# Patient Record
Sex: FEMALE | Race: WHITE | NOT HISPANIC OR LATINO | Employment: FULL TIME | URBAN - METROPOLITAN AREA
[De-identification: names, ages, dates, MRNs, and addresses within clinical notes are randomized per-mention and may not be internally consistent; named-entity substitution may affect disease eponyms.]

---

## 2023-04-20 ENCOUNTER — APPOINTMENT (OUTPATIENT)
Dept: URGENT CARE | Facility: CLINIC | Age: 38
End: 2023-04-20

## 2023-04-20 ENCOUNTER — APPOINTMENT (OUTPATIENT)
Dept: LAB | Facility: CLINIC | Age: 38
End: 2023-04-20

## 2023-04-20 DIAGNOSIS — Z02.1 PRE-EMPLOYMENT HEALTH SCREENING EXAMINATION: ICD-10-CM

## 2023-04-20 DIAGNOSIS — Z02.1 PRE-EMPLOYMENT HEALTH SCREENING EXAMINATION: Primary | ICD-10-CM

## 2023-04-20 LAB — RUBV IGG SERPL IA-ACNC: >175 IU/ML

## 2023-04-20 PROCEDURE — 86480 TB TEST CELL IMMUN MEASURE: CPT

## 2023-04-20 PROCEDURE — 86735 MUMPS ANTIBODY: CPT

## 2023-04-20 PROCEDURE — 86787 VARICELLA-ZOSTER ANTIBODY: CPT

## 2023-04-20 PROCEDURE — 86765 RUBEOLA ANTIBODY: CPT

## 2023-04-20 PROCEDURE — 86762 RUBELLA ANTIBODY: CPT

## 2023-04-20 PROCEDURE — 36415 COLL VENOUS BLD VENIPUNCTURE: CPT

## 2023-04-21 LAB
GAMMA INTERFERON BACKGROUND BLD IA-ACNC: 0.25 IU/ML
M TB IFN-G BLD-IMP: NEGATIVE
M TB IFN-G CD4+ BCKGRND COR BLD-ACNC: 0 IU/ML
M TB IFN-G CD4+ BCKGRND COR BLD-ACNC: 0.01 IU/ML
MEV IGG SER QL IA: ABNORMAL
MITOGEN IGNF BCKGRD COR BLD-ACNC: >10 IU/ML
MUV IGG SER QL IA: NORMAL
VZV IGG SER QL IA: NORMAL

## 2023-05-24 ENCOUNTER — OFFICE VISIT (OUTPATIENT)
Dept: FAMILY MEDICINE CLINIC | Facility: CLINIC | Age: 38
End: 2023-05-24

## 2023-05-24 VITALS
HEIGHT: 63 IN | BODY MASS INDEX: 21.83 KG/M2 | RESPIRATION RATE: 16 BRPM | HEART RATE: 58 BPM | WEIGHT: 123.2 LBS | TEMPERATURE: 98 F | DIASTOLIC BLOOD PRESSURE: 80 MMHG | OXYGEN SATURATION: 99 % | SYSTOLIC BLOOD PRESSURE: 122 MMHG

## 2023-05-24 DIAGNOSIS — F43.21 SITUATIONAL DEPRESSION: Primary | ICD-10-CM

## 2023-05-24 DIAGNOSIS — G47.30 SLEEP APNEA, UNSPECIFIED TYPE: ICD-10-CM

## 2023-05-24 DIAGNOSIS — F41.9 ANXIETY: ICD-10-CM

## 2023-05-24 RX ORDER — ALBUTEROL SULFATE 90 UG/1
2 AEROSOL, METERED RESPIRATORY (INHALATION) EVERY 6 HOURS PRN
COMMUNITY

## 2023-05-24 RX ORDER — CETIRIZINE HYDROCHLORIDE 10 MG/1
10 TABLET ORAL DAILY
COMMUNITY

## 2023-05-24 RX ORDER — AMITRIPTYLINE HYDROCHLORIDE 10 MG/1
10 TABLET, FILM COATED ORAL
Qty: 90 TABLET | Refills: 1 | Status: SHIPPED | OUTPATIENT
Start: 2023-05-24

## 2023-05-24 RX ORDER — HYDROXYZINE HYDROCHLORIDE 25 MG/1
25 TABLET, FILM COATED ORAL
Qty: 30 TABLET | Refills: 1 | Status: SHIPPED | OUTPATIENT
Start: 2023-05-24

## 2023-05-24 NOTE — PROGRESS NOTES
Name: Shruthi Valdes      : 1985      MRN: [id number]  Encounter Provider: Bernardo Ramirez DO  Encounter Date: 2023   Encounter department: [address] [place] [address]      1  Situational depression  Assessment & Plan:  Depression surrounding her sister's recent diagnosis of advanced lung cancer  Combined with other normal life stressors of being a mom to 2 young boys, relocating to the area for work, she has felt overwhelmed lately  She has baseline anxiety that is generally well controlled  She felt like her mood was improved while taking Elavil for migraine headaches which she has since discontinued  She would like to resume Elavil  Referral was provided for social work to assist with community resources for counseling, as well as [facility] psychology referral   We also discussed the employee assistance program may be a good option for her  Orders:  -     hydrOXYzine HCL (ATARAX) 25 mg tablet; Take 1 tablet (25 mg total) by mouth daily at bedtime  -     amitriptyline (ELAVIL) 10 mg tablet; Take 1 tablet (10 mg total) by mouth daily at bedtime  -     Ambulatory Referral to Psychology; Future  -     Ambulatory Referral to Social Work Care Management Program; Future    2  Anxiety  Assessment & Plan:  Some difficulty falling asleep due to ruminating thoughts, life stressors likely contributing  Trial of Atarax at bedtime  I instructed her to start with 12 5 mg at night due to her sensitivity to certain medications  If she tolerates this well or needs a higher dose she understands she can crease to 25 mg  Orders:  -     Ambulatory Referral to Psychology; Future  -     Ambulatory Referral to Social Work Care Management Program; Future    3  Sleep apnea, unspecified type  Assessment & Plan:  She is concern for sleep apnea based on the width of her tongue  Feels like she can breathe easier while trying to fall asleep when she thrusts her jaw forward    Referral to sleep "medicine for consult and sleep study was provided today  Orders:  -     Ambulatory Referral to Sleep Medicine; Future         Subjective      HPI     Patient presents to establish care  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[place], works as chiropractor   with 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, relocated from [place]  Sister recently diagnosed with stage 4 lung cancer  Very tearful in office today  Has a lot of life stressors including recent relocation, new practices in the network, etc      History of migraines as a teen, related to medications, recurred after pregnancies  Sensitive to estrogen  Had Rj in August but has had increased migraines since then  Improves with Tylenol and caffeine, dark room  Resolved after starting Elavil 10 mg which she has since discontinued  Feels good about changes in her life but overwhelmed  Not sleeping well, anxious at night  Unisom last night was helpful for sleep  Has wide tongue, difficulty falling asleep, when she thrusts her jaw forward, would like sleep study to evaluate for possible STACIE  Interested in therapy for processing of her stressors  Wants to wait to start mammograms  Will get records from previous doctors, unsure if due for pap  Tdap up to date  Review of Systems as in HPI    Current Outpatient Medications on File Prior to Visit   Medication Sig   • albuterol (PROVENTIL HFA,VENTOLIN HFA) 90 mcg/act inhaler Inhale 2 puffs every 6 (six) hours as needed for wheezing   • cetirizine (ZyrTEC) 10 mg tablet Take 10 mg by mouth daily       Objective     /80 (BP Location: Right arm, Patient Position: Sitting, Cuff Size: Standard)   Pulse 58   Temp 98 °F (36 7 °C) (Temporal)   Resp 16   Ht 5' 3\" (1 6 m)   Wt 55 9 kg (123 lb 3 2 oz)   LMP 03/18/2023 (Exact Date)   SpO2 99%   BMI 21 82 kg/m²     Physical Exam  Vitals reviewed  Constitutional:       Appearance: Normal appearance  Cardiovascular:      Rate and Rhythm: Normal rate     Pulmonary:      Effort: Pulmonary effort " is normal    Abdominal:      General: Abdomen is flat  Skin:     General: Skin is warm and dry  Neurological:      General: No focal deficit present  Mental Status: She is alert and oriented to person, place, and time     Psychiatric:         Mood and Affect: Mood normal          Behavior: Behavior normal        Fanta Crane DO

## 2023-05-29 PROBLEM — F43.21 SITUATIONAL DEPRESSION: Status: ACTIVE | Noted: 2023-05-29

## 2023-05-29 PROBLEM — G47.30 SLEEP APNEA: Status: ACTIVE | Noted: 2023-05-29

## 2023-05-29 PROBLEM — F41.9 ANXIETY: Status: ACTIVE | Noted: 2023-05-29

## 2023-05-30 NOTE — ASSESSMENT & PLAN NOTE
Depression surrounding her sister's recent diagnosis of advanced lung cancer  Combined with other normal life stressors of being a mom to 2 young boys, relocating to the area for work, she has felt overwhelmed lately  She has baseline anxiety that is generally well controlled  She felt like her mood was improved while taking Elavil for migraine headaches which she has since discontinued  She would like to resume Elavil  Referral was provided for social work to assist with community resources for counseling, as well as [facility] psychology referral   We also discussed the employee assistance program may be a good option for her

## 2023-05-30 NOTE — ASSESSMENT & PLAN NOTE
Some difficulty falling asleep due to ruminating thoughts, life stressors likely contributing  Trial of Atarax at bedtime  I instructed her to start with 12 5 mg at night due to her sensitivity to certain medications  If she tolerates this well or needs a higher dose she understands she can crease to 25 mg

## 2023-05-30 NOTE — ASSESSMENT & PLAN NOTE
She is concern for sleep apnea based on the width of her tongue  Feels like she can breathe easier while trying to fall asleep when she thrusts her jaw forward  Referral to sleep medicine for consult and sleep study was provided today

## 2023-05-31 ENCOUNTER — PATIENT OUTREACH (OUTPATIENT)
Dept: FAMILY MEDICINE CLINIC | Facility: CLINIC | Age: 38
End: 2023-05-31

## 2023-05-31 NOTE — PROGRESS NOTES
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

## 2023-06-06 ENCOUNTER — PATIENT OUTREACH (OUTPATIENT)
Dept: FAMILY MEDICINE CLINIC | Facility: CLINIC | Age: 38
End: 2023-06-06

## 2023-06-06 NOTE — LETTER
[address] [address]    Re: Uriah Naidu to Reach   6/6/2023       Dear [place] [address] [place] [address] [facility] Work  and wanted to be certain you had information to contact me should you desire assistance with or have questions about non-medical aspects of your care such as [but not limited to] medical insurance, housing, transportation, material needs, or emergency needs, as I have been unable to reach you  If I do not have an answer I will assist you in finding the appropriate agency or individual who can help  Please feel free to contact me at [phone]  Thank You      Sincerely,         Fadi Barcenas

## 2023-06-06 NOTE — PROGRESS NOTES
2nd outreach attempt to patient in attempt to assess needs and discuss therapy services/support in the area  Will also inform of the [address] [place] [address] Avenue     No answer, voicemail left, and awaiting return call  Will send Unable to Reach letter through Psychiatrict and close case at this time

## 2023-06-22 ENCOUNTER — PATIENT OUTREACH (OUTPATIENT)
Dept: FAMILY MEDICINE CLINIC | Facility: CLINIC | Age: 38
End: 2023-06-22

## 2023-06-22 NOTE — PROGRESS NOTES
Return call received from patient  Patient moved to [facility] roughly 6 months ago; is a new provider with the network  Patient has 2 children (5yo and 5yo); will be a part of the [place]  Patient informed that she has been dealing with a lot of stress related to recent move, transitioning her family to this area, status of her sons' schooling, and recent cancer diagnosis for her sister  Provided support and discussed available mental health services available to Lyndon Dyson and family members ([employer] [employer], employee assistance program, compsych, [employer] case management services, and alternate  services)  Patient also needing to register her children with [facility]; provided information for this  Discussed other supports that may be helpful for patient's children as they were connected with therapy prior to this move; provided resources for [facility] and [employer] for Children  No other needs at this time  Patient will review and outreach OP SWCM with any questions      Below resources provided to patient:    [address] [facility]  - [phone]  [address]  [facility] Intake - [address] [facility] Case Management Services - [place] [address] - [address] [facility] [phone]  [facility]  Community Services for [address] [place] [address] Registration Website

## 2023-08-25 ENCOUNTER — TRANSCRIBE ORDERS (OUTPATIENT)
Dept: LAB | Facility: CLINIC | Age: 38
End: 2023-08-25

## 2023-08-25 ENCOUNTER — APPOINTMENT (OUTPATIENT)
Dept: LAB | Facility: CLINIC | Age: 38
End: 2023-08-25

## 2023-08-25 DIAGNOSIS — Z00.8 HEALTH EXAMINATION IN POPULATION SURVEYS: Primary | ICD-10-CM

## 2023-08-25 DIAGNOSIS — Z00.8 HEALTH EXAMINATION IN POPULATION SURVEYS: ICD-10-CM

## 2023-08-25 LAB
CHOLEST SERPL-MCNC: 183 MG/DL
EST. AVERAGE GLUCOSE BLD GHB EST-MCNC: 108 MG/DL
HBA1C MFR BLD: 5.4 %
HDLC SERPL-MCNC: 66 MG/DL
LDLC SERPL CALC-MCNC: 98 MG/DL (ref 0–100)
NONHDLC SERPL-MCNC: 117 MG/DL
TRIGL SERPL-MCNC: 95 MG/DL

## 2023-08-25 PROCEDURE — 80061 LIPID PANEL: CPT

## 2023-08-25 PROCEDURE — 83036 HEMOGLOBIN GLYCOSYLATED A1C: CPT

## 2023-08-25 PROCEDURE — 36415 COLL VENOUS BLD VENIPUNCTURE: CPT

## 2023-09-13 ENCOUNTER — TELEPHONE (OUTPATIENT)
Age: 38
End: 2023-09-13

## 2023-09-13 NOTE — TELEPHONE ENCOUNTER
Rec'd call from patient requesting NEW for FULL BODY. Patient would prefer female physician. 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 Explained wait/cancellation list process & MyChart notification. Understanding was verbalized. Created wait list for patient.

## 2023-11-29 ENCOUNTER — OFFICE VISIT (OUTPATIENT)
Dept: FAMILY MEDICINE CLINIC | Facility: CLINIC | Age: 38
End: 2023-11-29
Payer: COMMERCIAL

## 2023-11-29 VITALS
OXYGEN SATURATION: 100 % | WEIGHT: 125 LBS | BODY MASS INDEX: 22.15 KG/M2 | TEMPERATURE: 98 F | HEART RATE: 72 BPM | HEIGHT: 63 IN | SYSTOLIC BLOOD PRESSURE: 120 MMHG | DIASTOLIC BLOOD PRESSURE: 72 MMHG | RESPIRATION RATE: 18 BRPM

## 2023-11-29 DIAGNOSIS — Z11.4 SCREENING FOR HIV (HUMAN IMMUNODEFICIENCY VIRUS): ICD-10-CM

## 2023-11-29 DIAGNOSIS — Z12.4 SCREENING FOR CERVICAL CANCER: ICD-10-CM

## 2023-11-29 DIAGNOSIS — Z12.31 ENCOUNTER FOR SCREENING MAMMOGRAM FOR BREAST CANCER: ICD-10-CM

## 2023-11-29 DIAGNOSIS — Z00.00 ANNUAL PHYSICAL EXAM: Primary | ICD-10-CM

## 2023-11-29 DIAGNOSIS — E55.9 VITAMIN D DEFICIENCY: ICD-10-CM

## 2023-11-29 DIAGNOSIS — M54.12 C7 RADICULOPATHY: ICD-10-CM

## 2023-11-29 DIAGNOSIS — Z13.6 SCREENING FOR CARDIOVASCULAR CONDITION: ICD-10-CM

## 2023-11-29 DIAGNOSIS — Z13.1 SCREENING FOR DIABETES MELLITUS: ICD-10-CM

## 2023-11-29 PROCEDURE — 99396 PREV VISIT EST AGE 40-64: CPT | Performed by: FAMILY MEDICINE

## 2023-11-29 RX ORDER — MELOXICAM 15 MG/1
15 TABLET ORAL DAILY PRN
Qty: 30 TABLET | Refills: 3 | Status: SHIPPED | OUTPATIENT
Start: 2023-11-29

## 2023-11-29 NOTE — PROGRESS NOTES
[address] [facility]    NAME: Sarita Worthy  AGE: 34 y.o. SEX: female  : 1985     DATE: 2023     Assessment and Plan:     Problem List Items Addressed This Visit    None  Visit Diagnoses     Annual physical exam    -  Primary    Relevant Orders    CBC and Platelet    Basic metabolic panel    Screening for cardiovascular condition        Relevant Orders    Lipid panel    Screening for HIV (human immunodeficiency virus)        Relevant Orders    HIV 1/2 AB/AG w Reflex [facility] for 2 yr old and above    Vitamin D deficiency        Relevant Orders    Vitamin D 25 hydroxy    Screening for diabetes mellitus        Relevant Orders    Hemoglobin A1C    Encounter for screening mammogram for breast cancer        Relevant Orders    Mammo screening bilateral w 3d & cad    Screening for cervical cancer        Relevant Orders    Ambulatory referral to Obstetrics / Gynecology    C7 radiculopathy        Relevant Medications    meloxicam (MOBIC) 15 mg tablet            Immunizations and preventive care screenings were discussed with patient today. Appropriate education was printed on patient's after visit summary. Counseling:  Alcohol/drug use: discussed moderation in alcohol intake, the recommendations for healthy alcohol use, and avoidance of illicit drug use. Dental Health: discussed importance of regular tooth brushing, flossing, and dental visits. Injury prevention: discussed safety/seat belts, safety helmets, smoke detectors, carbon dioxide detectors, and smoking near bedding or upholstery. Sexual health: discussed sexually transmitted diseases, partner selection, use of condoms, avoidance of unintended pregnancy, and contraceptive alternatives. Exercise: the importance of regular exercise/physical activity was discussed. Recommend exercise 3-5 times per week for at least 30 minutes. Return in 1 year (on 2024).      Chief Complaint:     Chief Complaint   Patient presents with   • Physical Exam      History of Present Illness:     Adult Annual Physical   Patient here for a comprehensive physical exam. The patient reports no problems. Diet and Physical Activity  Diet/Nutrition: well balanced diet. Exercise: moderate cardiovascular exercise. Has c7 radiculopathy. Took steroid taper which helped. Improving overall. Plans to lighten her schedule. Ibuprofen or aleve helps. Taking pepcid with it. Treating conservatively over the last 2-3 weeks, will defer pain management referral for now. 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 Depression Screening  PHQ-2/9 Depression Screening         General Health  Sleep: sleeps poorly. Did not like atarax. Still taking Elavil, helps with sleep. Hearing: normal - bilateral. Never got hearing checked. Ruptured TM once as a kid, and again on a rollercoaster. Has a problem with background noise. Vision: goes for regular eye exams and wears glasses. Dental: regular dental visits. /GYN Health  Follows with gynecology? no   Patient is: premenopausal    Advanced Care Planning  Do you have an advanced directive? no  Do you have a durable medical power of ?  no     Review of Systems:     Review of Systems   Past Medical History:     Past Medical History:   Diagnosis Date   • Migraines       Past Surgical History:     Past Surgical History:   Procedure Laterality Date   • HERNIA REPAIR        Social History:     Social History     Socioeconomic History   • Marital status: /Civil Union     Spouse name: None   • Number of children: None   • Years of education: None   • Highest education level: None   Occupational History   • None   Tobacco Use   • Smoking status: Never     Passive exposure: Never   • Smokeless tobacco: Never   Vaping Use   • Vaping Use: Never used   Substance and Sexual Activity   • Alcohol use: None   • Drug use: Never   • Sexual activity: None   Other Topics Concern   • None   Social History Narrative   • None     Social Determinants of Health     Financial Resource Strain: Not on file   Food Insecurity: Not on file   Transportation Needs: Not on file   Physical Activity: Not on file   Stress: Not on file   Social Connections: Not on file   Intimate Partner Violence: Not on file   Housing Stability: Not on file      Family History:     Family History   Problem Relation Age of Onset   • Anxiety disorder Mother    • Diabetes Father    • Hyperlipidemia Father    • Heart disease Father    • Lung cancer Sister    • Gout Brother    • Diabetes Maternal Grandmother       Current Medications:     Current Outpatient Medications   Medication Sig Dispense Refill   • albuterol (PROVENTIL HFA,VENTOLIN HFA) 90 mcg/act inhaler Inhale 2 puffs every 6 (six) hours as needed for wheezing     • amitriptyline (ELAVIL) 10 mg tablet Take 1 tablet (10 mg total) by mouth daily at bedtime 90 tablet 1   • cetirizine (ZyrTEC) 10 mg tablet Take 10 mg by mouth daily     • meloxicam (MOBIC) 15 mg tablet Take 1 tablet (15 mg total) by mouth daily as needed for moderate pain 30 tablet 3     No current facility-administered medications for this visit. Allergies: Allergies   Allergen Reactions   • Oxycodone Vomiting      Physical Exam:     /72 (BP Location: Left arm, Patient Position: Sitting, Cuff Size: Standard)   Pulse 72   Temp 98 °F (36.7 °C) (Temporal)   Resp 18   Ht 5' 3" (1.6 m)   Wt 56.7 kg (125 lb)   LMP  (LMP Unknown)   SpO2 100%   BMI 22.14 kg/m²     Physical Exam  Vitals reviewed. Constitutional:       General: She is not in acute distress. Appearance: She is well-developed. HENT:      Head: Normocephalic and atraumatic. Right Ear: External ear normal.      Left Ear: External ear normal.      Mouth/Throat:      Mouth: Mucous membranes are moist.      Pharynx: Oropharynx is clear.    Eyes:      Conjunctiva/sclera: Conjunctivae normal. Cardiovascular:      Rate and Rhythm: Normal rate and regular rhythm. Heart sounds: No murmur heard. Pulmonary:      Effort: Pulmonary effort is normal. No respiratory distress. Breath sounds: Normal breath sounds. Abdominal:      General: Abdomen is flat. Palpations: Abdomen is soft. Tenderness: There is no abdominal tenderness. Musculoskeletal:         General: No swelling. Skin:     General: Skin is warm and dry. Capillary Refill: Capillary refill takes less than 2 seconds. Neurological:      Mental Status: She is alert.    Psychiatric:         Mood and Affect: Mood normal.         Behavior: [place]  [address] [facility],[address]

## 2024-01-03 ENCOUNTER — OFFICE VISIT (OUTPATIENT)
Dept: SLEEP CENTER | Facility: CLINIC | Age: 39
End: 2024-01-03
Payer: COMMERCIAL

## 2024-01-03 VITALS
HEIGHT: 63 IN | WEIGHT: 124 LBS | OXYGEN SATURATION: 99 % | HEART RATE: 56 BPM | DIASTOLIC BLOOD PRESSURE: 68 MMHG | BODY MASS INDEX: 21.97 KG/M2 | SYSTOLIC BLOOD PRESSURE: 122 MMHG

## 2024-01-03 DIAGNOSIS — G47.8 NON-RESTORATIVE SLEEP: ICD-10-CM

## 2024-01-03 DIAGNOSIS — F43.9 STRESS AT HOME: ICD-10-CM

## 2024-01-03 DIAGNOSIS — G47.30 SLEEP APNEA, UNSPECIFIED TYPE: Primary | ICD-10-CM

## 2024-01-03 DIAGNOSIS — Z91.09 ENVIRONMENTAL ALLERGIES: ICD-10-CM

## 2024-01-03 DIAGNOSIS — G47.19 EXCESSIVE DAYTIME SLEEPINESS: ICD-10-CM

## 2024-01-03 PROCEDURE — 99244 OFF/OP CNSLTJ NEW/EST MOD 40: CPT | Performed by: INTERNAL MEDICINE

## 2024-01-03 NOTE — PROGRESS NOTES
" Consultation - Sleep Center   Yvrose Seaman  38 y.o. female  :1985  MRN:[id number]  DOS:1/3/2024    Physician Requesting Consult: Gian Nascimento DO             Reason for Consult : At your kind request I saw Yvrose Seaman for initial sleep evaluation today. The patient is here to evaluate for suspected Obstructive Sleep Apnea.  Patient's presents with: Problems falling and/or staying asleep, Excessive daytime sleepiness.    PFSH, Problem List, Medications & Allergies were reviewed in EMR.    Yvrose  has a past medical history of Migraines.      She has a current medication list which includes the following prescription(s): albuterol, amitriptyline, cetirizine, and meloxicam.      HPI: She is here for complaints of snoring and recently when needing to sleep supine has breathing difficulty during sleep.  Yvrose is un aware of Snorting  Other complaints: Nonrestorative sleep. Restless Leg Syndrome: has typical symptoms but occurs infrequently and not disturbing sleep;  Parasomnia: reports teeth clenching during sleep;   Sleep Routine (averaged): Typical Bedtime: 9-10 PM.  Gets OOB: 6-7 AM. TIB:>8 hrs.   Sleep latency:> 60 minutes and attributes to psychosocial stressors causing racing thoughts -she attributes to being \"a working mother and recently relocated to this area \".  Sleep Interruptions: 3 to 4 times per night,  not always sure of the cause and struggles to fall back asleep. Awakens: Needing an alarm  Upon awakening: Never feels refreshed.  She estimates getting 6 hrs sleep.  Daytime Function:Yvrose reports excessive daytime sleepiness feels like napping but does not have the opportunity. She rated herself at Total score: 11 /24 on the [place] Sleepiness Scale.     Habits:   reports that she has never smoked. She has never been exposed to tobacco smoke. She has never used smokeless tobacco.;  has no history on file for alcohol use.; Reports no history of drug use.;  E-Cigarette/Vaping    E-Cigarette " "Use  Never User; Caffeine use:limited; Exercise routine: regular.    Occupation: Work Full Time   Family History: Father has narcolepsy and she suspects sleep apnea  ROS: Significant for around 5 pounds weight gain recently..     EXAM:  /68 (BP Location: Right arm, Patient Position: Sitting, Cuff Size: Standard)   Pulse 56   Ht 5' 3\" (1.6 m)   Wt 56.2 kg (124 lb)   SpO2 99%   BMI 21.97 kg/m²    General: Well groomed female, well appearing, in no apparent distress.   Neurological: Alert and orientated; cooperative; Cranial nerves intact;    Psychiatric: Speech: Clear and coherent; normal mood, affect & thought   Skin: Warm and dry; Color& Hydration good; no facial rashes or lesions   HEENT:  Craniofacial anatomy: normal Sinuses: Non-tender. TMJ: Normal    Eyes: EOM's intact; conjunctiva/corneas clear   Ears: Appear normal     Nasal Airway: is patent and narrow nares Septum: Intact; Turbinates: Normal; Rhinorrhea: None  Mouth: Lips: Normal posture; Dentition: normal . Mucosa: Moist; Hard Palate:normal    Oropharryx: crowded and AP narrowing Tongue: Mallampati:Class IV, Mobile, and ScallopedSoft Palate:  redundant  Tonsils: absent  Neck: Neck Circumference: 12 \"; supple; no abnormal masses; Thyroid: Normal. Trachea: Central.    Lymph: No cervical or submandibular Lymhadenopathy  Heart: S1,S2 normal; RRR; no gallop; no murmur   Lungs: Respiratory Effort: Normal. Air entry good bilaterally.  No wheezes.  No rales  Abdomen:  Soft & non-tender    Extremities: No pedal edema.  No clubbing or cyanosis.    Musculoskeletal:  Motor normal; Gait: Normal.       IMPRESSION: Primary/Secondary Sleep Diagnoses (to Medical or Psych conditions) & Comorbidities   1. Sleep apnea, unspecified type  Ambulatory Referral to Sleep Medicine    Home Study      2. Non-restorative sleep  Home Study      3. Excessive daytime sleepiness  Home Study      4. Stress at home        5. Environmental allergies             PLAN:   1. With " "respect to above conditions, comprehensive counseling provided on pathophysiology; effects on symptoms and comorbidities, diagnostic strategies & limitations; treatment options; risks or no treament; risks & benefits of the various therapeutic options; costs and insurance aspects.     2. I advised weight reduction, avoiding sleeping supine, using alcohol or sedating medications close to bed time and on safe driving practices.    3. Sleep testing is indicated and a diagnostic study will be scheduled.   4.  Patient is willing to try Positive airway pressure therapy and will be scheduled for a titration study if indicated.  5. Follow-up to be scheduled after testing initiation of therapy to review results, further details of treatment options and to adjust therapy.    Sincerely,      Authenticated electronically on 01/03/24   Board Certified Specialist     Portions of the record may have been created with voice recognition software. Occasional wrong word or \"sound a like\" substitutions may have occurred due to the inherent limitations of voice recognition software. There may also be notations and random deletions of words or characters from malfunctioning software. Read the chart carefully and recognize, using context, where substitutions/deletions have occurred.     "

## 2024-01-03 NOTE — PATIENT INSTRUCTIONS
What is STACIE?   Obstructive sleep apnea is a common and serious sleep disorder that causes you to stop breathing during sleep. The airway repeatedly becomes blocked, limiting the amount of air that reaches your lungs. When this happens, you may snore loudly or making choking noises as you try to breathe. Your brain and body becomes oxygen deprived and you may wake up. This may happen a few times a night, or in more severe cases, several hundred times a night.   Sleep apnea can make you wake up in the morning feeling tired or unrefreshed even though you have had a full night of sleep. During the day, you may feel fatigued, have difficulty concentrating or you may even unintentionally fall asleep. This is because your body is waking up numerous times throughout the night, even though you might not be conscious of each awakening.  The lack of oxygen your body receives can have negative long-term consequences for your health. This includes:  High blood pressure  Heart disease  Irregular heart rhythms  Stroke  Pre-diabetes and diabetes  Depression  Testing  An objective evaluation of your sleep may be needed before your board certified sleep physician can make a diagnosis. Options include:   In-lab overnight sleep study  This type of sleep study requires you to stay overnight at a sleep center, in a bed that may resemble a hotel room. You will sleep with sensors hooked up to various parts of your body. These sensors record your brain waves, heartbeat, breathing and movement. An overnight sleep study also provides your doctor with the most complete information about your sleep. Learn more about an overnight sleep study.   Home sleep apnea test  Some patients with high risk factors for obstructive sleep apnea and no other medical disorders may be candidates for a home sleep apnea test. The testing equipment differs in that it is less complicated than what is used in an overnight sleep study. As such, does not give all the  data an in-lab will and if negative, may not mean you do not have the problem.  Treatment for sleep apnea includes using a continuous positive airway pressure (CPAP) machine to keep your airway open during sleep. A mask is placed over your nose and mouth, or just your nose. The mask is hooked to the CPAP machine that blows a gentle stream of air into the mask when you breathe. This helps keep your airway open so you can breathe more regularly. Extra oxygen may be given to you through the machine. You may be given a mouth device. It looks like a mouth guard or dental retainer and stops your tongue and mouth tissues from blocking your throat while you sleep. Surgery may be needed to remove extra tissues that block your mouth, throat, or nose.  Manage sleep apnea:   Do not smoke. Nicotine and other chemicals in cigarettes and cigars can cause lung damage. Ask your healthcare provider for information if you currently smoke and need help to quit. E-cigarettes or smokeless tobacco still contain nicotine. Talk to your healthcare provider before you use these products.  Do not drink alcohol or take sedative medicine before you go to sleep. Alcohol and sedatives can relax the muscles and tissues around your throat. This can block the airflow to your lungs.  Maintain a healthy weight. Excess tissue around your throat may restrict your breathing. Ask your healthcare provider for information if you need to lose weight.  Sleep on your side or use pillows designed to prevent sleep apnea. This prevents your tongue or other tissues from blocking your throat. You can also raise the head of your bed.  Driving Safety. Refrain from driving when drowsy.   Follow up with your healthcare provider as directed: Write down your questions so you remember to ask them during your visits.  Go to AASM website for more information: Sleepeducation.org  What is STACIE?   Obstructive sleep apnea is a common and serious sleep disorder that causes you to  stop breathing during sleep. The airway repeatedly becomes blocked, limiting the amount of air that reaches your lungs. When this happens, you may snore loudly or making choking noises as you try to breathe. Your brain and body becomes oxygen deprived and you may wake up. This may happen a few times a night, or in more severe cases, several hundred times a night.   Sleep apnea can make you wake up in the morning feeling tired or unrefreshed even though you have had a full night of sleep. During the day, you may feel fatigued, have difficulty concentrating or you may even unintentionally fall asleep. This is because your body is waking up numerous times throughout the night, even though you might not be conscious of each awakening.  The lack of oxygen your body receives can have negative long-term consequences for your health. This includes:  High blood pressure  Heart disease  Irregular heart rhythms  Stroke  Pre-diabetes and diabetes  Depression  Testing  An objective evaluation of your sleep may be needed before your board certified sleep physician can make a diagnosis. Options include:   In-lab overnight sleep study  This type of sleep study requires you to stay overnight at a sleep center, in a bed that may resemble a hotel room. You will sleep with sensors hooked up to various parts of your body. These sensors record your brain waves, heartbeat, breathing and movement. An overnight sleep study also provides your doctor with the most complete information about your sleep. Learn more about an overnight sleep study.   Home sleep apnea test  Some patients with high risk factors for obstructive sleep apnea and no other medical disorders may be candidates for a home sleep apnea test. The testing equipment differs in that it is less complicated than what is used in an overnight sleep study. As such, does not give all the data an in-lab will and if negative, may not mean you do not have the problem.  Treatment for  sleep apnea includes using a continuous positive airway pressure (CPAP) machine to keep your airway open during sleep. A mask is placed over your nose and mouth, or just your nose. The mask is hooked to the CPAP machine that blows a gentle stream of air into the mask when you breathe. This helps keep your airway open so you can breathe more regularly. Extra oxygen may be given to you through the machine. You may be given a mouth device. It looks like a mouth guard or dental retainer and stops your tongue and mouth tissues from blocking your throat while you sleep. Surgery may be needed to remove extra tissues that block your mouth, throat, or nose.  Manage sleep apnea:   Do not smoke. Nicotine and other chemicals in cigarettes and cigars can cause lung damage. Ask your healthcare provider for information if you currently smoke and need help to quit. E-cigarettes or smokeless tobacco still contain nicotine. Talk to your healthcare provider before you use these products.  Do not drink alcohol or take sedative medicine before you go to sleep. Alcohol and sedatives can relax the muscles and tissues around your throat. This can block the airflow to your lungs.  Maintain a healthy weight. Excess tissue around your throat may restrict your breathing. Ask your healthcare provider for information if you need to lose weight.  Sleep on your side or use pillows designed to prevent sleep apnea. This prevents your tongue or other tissues from blocking your throat. You can also raise the head of your bed.  Driving Safety. Refrain from driving when drowsy.   Follow up with your healthcare provider as directed: Write down your questions so you remember to ask them during your visits.  Go to AASM website for more information: Sleepeducation.org      Nursing Support:  When: Monday through Friday 7A-5PM except holidays  Where: Our direct line is [phone].    If you are having a true emergency please call 911.  In the event that the  line is busy or it is after hours please leave a voice message and we will return your call.  Please speak clearly, leaving your full name, birth date, best number to reach you and the reason for your call.   Medication refills: We will need the name of the medication, the dosage, the ordering provider, whether you get a 30 or 90 day refill, and the pharmacy name and address.  Medications will be ordered by the provider only.  Nurses cannot call in prescriptions.  Please allow 7 days for medication refills.  Physician requested updates: If your provider requested that you call with an update after starting medication, please be ready to provide us the medication and dosage, what time you take your medication, the time you attempt to fall asleep, time you fall asleep, when you wake up, and what time you get out of bed.  Sleep Study Results: We will contact you with sleep study results and/or next steps after the physician has reviewed your testing.

## 2024-01-05 ENCOUNTER — HOSPITAL ENCOUNTER (OUTPATIENT)
Dept: SLEEP CENTER | Facility: CLINIC | Age: 39
Discharge: HOME | End: 2024-01-05
Payer: COMMERCIAL

## 2024-01-05 DIAGNOSIS — G47.19 EXCESSIVE DAYTIME SLEEPINESS: ICD-10-CM

## 2024-01-05 DIAGNOSIS — G47.30 SLEEP APNEA, UNSPECIFIED TYPE: ICD-10-CM

## 2024-01-05 DIAGNOSIS — G47.8 NON-RESTORATIVE SLEEP: ICD-10-CM

## 2024-01-05 PROCEDURE — G0399 HOME SLEEP TEST/TYPE 3 PORTA: HCPCS

## 2024-01-05 NOTE — PROGRESS NOTES
Home Sleep Study Documentation    HOME STUDY DEVICE: Noxturnal no                                           Teodora G3 yes      Pre-Sleep Home Study:    Set-up and instructions performed by: Kathy    Technician performed demonstration for Patient: yes    Return demonstration performed by Patient: yes    Written instructions provided to Patient: yes    Patient signed consent form: yes        Post-Sleep Home Study:    Additional comments by Patient:       Home Sleep Study Failed:no:    Failure reason: N/A    Reported or Detected: N/A    Scored by: JAVIER Faith

## 2024-04-10 ENCOUNTER — OFFICE VISIT (OUTPATIENT)
Dept: OBGYN CLINIC | Facility: CLINIC | Age: 39
End: 2024-04-10
Payer: COMMERCIAL

## 2024-04-10 VITALS
BODY MASS INDEX: 21.97 KG/M2 | SYSTOLIC BLOOD PRESSURE: 108 MMHG | WEIGHT: 124 LBS | HEIGHT: 63 IN | DIASTOLIC BLOOD PRESSURE: 62 MMHG

## 2024-04-10 DIAGNOSIS — Z12.4 SCREENING FOR CERVICAL CANCER: ICD-10-CM

## 2024-04-10 DIAGNOSIS — Z11.51 SCREENING FOR HPV (HUMAN PAPILLOMAVIRUS): ICD-10-CM

## 2024-04-10 DIAGNOSIS — Z01.419 WELL WOMAN EXAM WITH ROUTINE GYNECOLOGICAL EXAM: Primary | ICD-10-CM

## 2024-04-10 DIAGNOSIS — Z12.4 ENCOUNTER FOR SCREENING FOR MALIGNANT NEOPLASM OF CERVIX: ICD-10-CM

## 2024-04-10 PROCEDURE — G0476 HPV COMBO ASSAY CA SCREEN: HCPCS | Performed by: PHYSICIAN ASSISTANT

## 2024-04-10 PROCEDURE — G0145 SCR C/V CYTO,THINLAYER,RESCR: HCPCS | Performed by: PHYSICIAN ASSISTANT

## 2024-04-10 PROCEDURE — S0610 ANNUAL GYNECOLOGICAL EXAMINA: HCPCS | Performed by: PHYSICIAN ASSISTANT

## 2024-04-10 NOTE — PROGRESS NOTES
ASSESSMENT & PLAN:   Diagnoses and all orders for this visit:    Well woman exam with routine gynecological exam  -     Liquid-based pap, screening    Encounter for screening for malignant neoplasm of cervix  -     Liquid-based pap, screening    Screening for HPV (human papillomavirus)  -     Liquid-based pap, screening    Screening for cervical cancer  -     Ambulatory referral to Obstetrics / Gynecology          The following were reviewed in today's visit: ASCCP guidelines, Gardisil vaccination, STD testing breast self exam, mammography screening ordered, STD testing, exercise, and healthy diet.    Patient to return to office in yearly for annual exam.     All questions have been answered to her satisfaction.        CC:  Annual Gynecologic Examination  Chief Complaint   Patient presents with    Gynecologic Exam     Mammo scheduled 24        HPI: Yvrose Seaman is a 38 y.o.  who presents for annual gynecologic examination.  She has the following concerns:  patient reports some PMS symptoms. Previously was on micronor for many years. Hx sensory migraines and migraine with aura. Considering restarting POP or possible IUD.      Health Maintenance:    Exercise: frequently  Breast exams/breast awareness: yes  Last mammogram: scheduled for     Past Medical History:   Diagnosis Date    Migraines        Past Surgical History:   Procedure Laterality Date    HERNIA REPAIR         Past OB/Gyn History:  Period Pattern: (!) IrregularPatient's last menstrual period was 2024.    Last Pap: within the last 5 years : no abnormalities  History of abnormal Pap smear: No  HPV vaccine completed: no    Patient is currently sexually active.   STD testing: no  Current contraception: vasectomy      Family History  Family History   Problem Relation Age of Onset    No Known Problems Mother     Diabetes Father     Hyperlipidemia Father     Heart disease Father     Lung cancer Sister     Gout Brother     Diabetes Maternal  Grandmother        Family history of uterine or ovarian cancer: no  Family history of breast cancer: no  Family history of colon cancer: no    Social History:  Social History     Socioeconomic History    Marital status: /Civil Union     Spouse name: Not on file    Number of children: Not on file    Years of education: Not on file    Highest education level: Not on file   Occupational History    Not on file   Tobacco Use    Smoking status: Never     Passive exposure: Never    Smokeless tobacco: Never   Vaping Use    Vaping status: Never Used   Substance and Sexual Activity    Alcohol use: Yes     Comment: socailly/rarely    Drug use: Never    Sexual activity: Yes     Partners: Male     Birth control/protection: Male Sterilization   Other Topics Concern    Not on file   Social History Narrative    Not on file     Social Determinants of Health     Financial Resource Strain: Not on file   Food Insecurity: Not on file   Transportation Needs: Not on file   Physical Activity: Not on file   Stress: Not on file   Social Connections: Not on file   Intimate Partner Violence: Not on file   Housing Stability: Not on file     Domestic violence screen: negative    Allergies:  Allergies   Allergen Reactions    Oxycodone Vomiting       Medications:    Current Outpatient Medications:     albuterol (PROVENTIL HFA,VENTOLIN HFA) 90 mcg/act inhaler, Inhale 2 puffs every 6 (six) hours as needed for wheezing, Disp: , Rfl:     amitriptyline (ELAVIL) 10 mg tablet, Take 1 tablet (10 mg total) by mouth daily at bedtime (Patient taking differently: Take 10 mg by mouth daily at bedtime as needed), Disp: 90 tablet, Rfl: 1    cetirizine (ZyrTEC) 10 mg tablet, Take 10 mg by mouth daily, Disp: , Rfl:     Review of Systems:  Review of Systems   Constitutional:  Negative for chills, fever and unexpected weight change.   Respiratory:  Negative for shortness of breath.    Cardiovascular:  Negative for chest pain.   Gastrointestinal:  Negative for  "abdominal pain, diarrhea, nausea and vomiting.   Skin:  Negative for rash.   Psychiatric/Behavioral:  Negative for dysphoric mood. The patient is not nervous/anxious.          Physical Exam:  /62 (BP Location: Right arm, Patient Position: Sitting, Cuff Size: Standard)   Ht 5' 3\" (1.6 m)   Wt 56.2 kg (124 lb)   LMP 03/26/2024   BMI 21.97 kg/m²    Physical Exam  Constitutional:       Appearance: Normal appearance.   Genitourinary:      Vulva and urethral meatus normal.      No lesions in the vagina.      Right Labia: No rash or lesions.     Left Labia: No lesions or rash.     No vaginal discharge, erythema or bleeding.        Right Adnexa: not tender and no mass present.     Left Adnexa: not tender and no mass present.     No cervical discharge or lesion.      Uterus is not tender.   Breasts:     Breasts are symmetrical.      Right: No mass or skin change.      Left: No mass or skin change.   HENT:      Head: Normocephalic and atraumatic.   Cardiovascular:      Rate and Rhythm: Normal rate and regular rhythm.      Heart sounds: Normal heart sounds. No murmur heard.     No friction rub. No gallop.   Pulmonary:      Effort: Pulmonary effort is normal.      Breath sounds: Normal breath sounds. No wheezing, rhonchi or rales.   Abdominal:      General: Abdomen is flat. There is no distension.      Palpations: Abdomen is soft.      Tenderness: There is no abdominal tenderness.   Musculoskeletal:      Cervical back: Neck supple.   Lymphadenopathy:      Upper Body:      Right upper body: No axillary adenopathy.      Left upper body: No axillary adenopathy.   Neurological:      General: No focal deficit present.      Mental Status: She is alert.   Skin:     General: Skin is warm and dry.   Psychiatric:         Mood and Affect: Mood normal.         Behavior: Behavior normal.   Vitals reviewed.                               "

## 2024-04-11 LAB
HPV HR 12 DNA CVX QL NAA+PROBE: NEGATIVE
HPV16 DNA CVX QL NAA+PROBE: NEGATIVE
HPV18 DNA CVX QL NAA+PROBE: NEGATIVE

## 2024-04-17 LAB
LAB AP GYN PRIMARY INTERPRETATION: NORMAL
Lab: NORMAL

## 2024-04-24 ENCOUNTER — HOSPITAL ENCOUNTER (OUTPATIENT)
Dept: MAMMOGRAPHY | Facility: IMAGING CENTER | Age: 39
Discharge: HOME | End: 2024-04-24
Payer: COMMERCIAL

## 2024-04-24 VITALS — BODY MASS INDEX: 21.97 KG/M2 | WEIGHT: 124 LBS | HEIGHT: 63 IN

## 2024-04-24 DIAGNOSIS — Z12.31 ENCOUNTER FOR SCREENING MAMMOGRAM FOR BREAST CANCER: ICD-10-CM

## 2024-04-24 PROCEDURE — 77063 BREAST TOMOSYNTHESIS BI: CPT

## 2024-04-24 PROCEDURE — 77067 SCR MAMMO BI INCL CAD: CPT

## 2024-09-04 DIAGNOSIS — Z00.6 ENCOUNTER FOR EXAMINATION FOR NORMAL COMPARISON OR CONTROL IN CLINICAL RESEARCH PROGRAM: ICD-10-CM

## 2024-09-09 ENCOUNTER — APPOINTMENT (OUTPATIENT)
Dept: LAB | Facility: CLINIC | Age: 39
End: 2024-09-09
Payer: COMMERCIAL

## 2024-09-09 DIAGNOSIS — Z00.6 ENCOUNTER FOR EXAMINATION FOR NORMAL COMPARISON OR CONTROL IN CLINICAL RESEARCH PROGRAM: ICD-10-CM

## 2024-09-09 DIAGNOSIS — E55.9 VITAMIN D DEFICIENCY: ICD-10-CM

## 2024-09-09 DIAGNOSIS — Z00.00 ANNUAL PHYSICAL EXAM: ICD-10-CM

## 2024-09-09 DIAGNOSIS — Z13.1 SCREENING FOR DIABETES MELLITUS: ICD-10-CM

## 2024-09-09 DIAGNOSIS — Z13.6 SCREENING FOR CARDIOVASCULAR CONDITION: ICD-10-CM

## 2024-09-09 DIAGNOSIS — Z11.4 SCREENING FOR HIV (HUMAN IMMUNODEFICIENCY VIRUS): ICD-10-CM

## 2024-09-09 LAB
25(OH)D3 SERPL-MCNC: 18.6 NG/ML (ref 30–100)
ANION GAP SERPL CALCULATED.3IONS-SCNC: 6 MMOL/L (ref 4–13)
BUN SERPL-MCNC: 14 MG/DL (ref 5–25)
CALCIUM SERPL-MCNC: 8.7 MG/DL (ref 8.4–10.2)
CHLORIDE SERPL-SCNC: 105 MMOL/L (ref 96–108)
CHOLEST SERPL-MCNC: 197 MG/DL
CO2 SERPL-SCNC: 27 MMOL/L (ref 21–32)
CREAT SERPL-MCNC: 0.76 MG/DL (ref 0.6–1.3)
ERYTHROCYTE [DISTWIDTH] IN BLOOD BY AUTOMATED COUNT: 12 % (ref 11.6–15.1)
EST. AVERAGE GLUCOSE BLD GHB EST-MCNC: 111 MG/DL
GFR SERPL CREATININE-BSD FRML MDRD: 97 ML/MIN/1.73SQ M
GLUCOSE P FAST SERPL-MCNC: 92 MG/DL (ref 65–99)
HBA1C MFR BLD: 5.5 %
HCT VFR BLD AUTO: 41.8 % (ref 34.8–46.1)
HDLC SERPL-MCNC: 66 MG/DL
HGB BLD-MCNC: 13.7 G/DL (ref 11.5–15.4)
HIV 1+2 AB+HIV1 P24 AG SERPL QL IA: NORMAL
HIV 2 AB SERPL QL IA: NORMAL
HIV1 AB SERPL QL IA: NORMAL
HIV1 P24 AG SERPL QL IA: NORMAL
LDLC SERPL CALC-MCNC: 115 MG/DL (ref 0–100)
MCH RBC QN AUTO: 31.4 PG (ref 26.8–34.3)
MCHC RBC AUTO-ENTMCNC: 32.8 G/DL (ref 31.4–37.4)
MCV RBC AUTO: 96 FL (ref 82–98)
NONHDLC SERPL-MCNC: 131 MG/DL
PLATELET # BLD AUTO: 194 THOUSANDS/UL (ref 149–390)
PMV BLD AUTO: 9.4 FL (ref 8.9–12.7)
POTASSIUM SERPL-SCNC: 4.6 MMOL/L (ref 3.5–5.3)
RBC # BLD AUTO: 4.37 MILLION/UL (ref 3.81–5.12)
SODIUM SERPL-SCNC: 138 MMOL/L (ref 135–147)
TRIGL SERPL-MCNC: 80 MG/DL
WBC # BLD AUTO: 4.95 THOUSAND/UL (ref 4.31–10.16)

## 2024-09-09 PROCEDURE — 87389 HIV-1 AG W/HIV-1&-2 AB AG IA: CPT

## 2024-09-09 PROCEDURE — 82306 VITAMIN D 25 HYDROXY: CPT

## 2024-09-09 PROCEDURE — 83036 HEMOGLOBIN GLYCOSYLATED A1C: CPT

## 2024-09-09 PROCEDURE — 80061 LIPID PANEL: CPT

## 2024-09-09 PROCEDURE — 36415 COLL VENOUS BLD VENIPUNCTURE: CPT

## 2024-09-09 PROCEDURE — 80048 BASIC METABOLIC PNL TOTAL CA: CPT

## 2024-09-09 PROCEDURE — 85027 COMPLETE CBC AUTOMATED: CPT

## 2024-09-17 LAB
APOB+LDLR+PCSK9 GENE MUT ANL BLD/T: NOT DETECTED
BRCA1+BRCA2 DEL+DUP + FULL MUT ANL BLD/T: NOT DETECTED
MLH1+MSH2+MSH6+PMS2 GN DEL+DUP+FUL M: NOT DETECTED

## 2024-12-04 ENCOUNTER — OFFICE VISIT (OUTPATIENT)
Dept: FAMILY MEDICINE CLINIC | Facility: CLINIC | Age: 39
End: 2024-12-04
Payer: COMMERCIAL

## 2024-12-04 VITALS — DIASTOLIC BLOOD PRESSURE: 76 MMHG | SYSTOLIC BLOOD PRESSURE: 118 MMHG | TEMPERATURE: 98.4 F

## 2024-12-04 DIAGNOSIS — F90.9 ATTENTION DEFICIT HYPERACTIVITY DISORDER (ADHD), UNSPECIFIED ADHD TYPE: ICD-10-CM

## 2024-12-04 DIAGNOSIS — Z12.31 ENCOUNTER FOR SCREENING MAMMOGRAM FOR BREAST CANCER: ICD-10-CM

## 2024-12-04 DIAGNOSIS — Z00.00 ANNUAL PHYSICAL EXAM: Primary | ICD-10-CM

## 2024-12-04 PROCEDURE — 99213 OFFICE O/P EST LOW 20 MIN: CPT | Performed by: FAMILY MEDICINE

## 2024-12-04 PROCEDURE — 99396 PREV VISIT EST AGE 40-64: CPT | Performed by: FAMILY MEDICINE

## 2024-12-04 RX ORDER — DEXTROAMPHETAMINE SACCHARATE, AMPHETAMINE ASPARTATE MONOHYDRATE, DEXTROAMPHETAMINE SULFATE AND AMPHETAMINE SULFATE 2.5; 2.5; 2.5; 2.5 MG/1; MG/1; MG/1; MG/1
10 CAPSULE, EXTENDED RELEASE ORAL EVERY MORNING
Qty: 30 CAPSULE | Refills: 0 | Status: SHIPPED | OUTPATIENT
Start: 2024-12-04

## 2024-12-04 NOTE — PROGRESS NOTES
Adult Annual Physical  Name: Yvrose Seaman      : 1985      MRN: [id number]  Encounter Provider: Gian Nascimento DO  Encounter Date: 2024   Encounter department: [facility]    Assessment & Plan  Attention deficit hyperactivity disorder (ADHD), unspecified ADHD type  New diagnosis. Discussed recommendations for treatment, she is agreeable to trial of Adderall. Also education provided on tips and tricks for dealing with attention deficit. Follow up in 2-4 weeks to discuss medication effectiveness.  Orders:    amphetamine-dextroamphetamine (ADDERALL XR, 10MG,) 10 MG 24 hr capsule; Take 1 capsule (10 mg total) by mouth every morning Max Daily Amount: 10 mg    Annual physical exam  Up to date on age appropriate screenings and labs.        Immunizations and preventive care screenings were discussed with patient today. Appropriate education was printed on patient's after visit summary.    Counseling:  Alcohol/drug use: discussed moderation in alcohol intake, the recommendations for healthy alcohol use, and avoidance of illicit drug use.  Dental Health: discussed importance of regular tooth brushing, flossing, and dental visits.  Sexual health: discussed sexually transmitted diseases, partner selection, use of condoms, avoidance of unintended pregnancy, and contraceptive alternatives.  Exercise: the importance of regular exercise/physical activity was discussed. Recommend exercise 3-5 times per week for at least 30 minutes.          History of Present Illness       Saw optometry, changed glasses prescription due to concern for migraines.  Starting seeing therapist, a couple of sessions now, agrees with ADHD diagnosis.      Adult Annual Physical:  Patient presents for annual physical.     Diet and Physical Activity:  - Diet/Nutrition: well balanced diet.  - Exercise: moderate cardiovascular exercise.    General Health:  - Sleep: sleeps well.  - Vision: wears glasses and goes for  regular eye exams.    /GYN Health:  - Follows with GYN: yes.     Review of Systems  Current Outpatient Medications on File Prior to Visit   Medication Sig Dispense Refill    albuterol (PROVENTIL HFA,VENTOLIN HFA) 90 mcg/act inhaler Inhale 2 puffs every 6 (six) hours as needed for wheezing      amitriptyline (ELAVIL) 10 mg tablet Take 1 tablet (10 mg total) by mouth daily at bedtime 90 tablet 1    cetirizine (ZyrTEC) 10 mg tablet Take 10 mg by mouth daily       No current facility-administered medications on file prior to visit.      Social History     Tobacco Use    Smoking status: Never     Passive exposure: Never    Smokeless tobacco: Never   Vaping Use    Vaping status: Never Used   Substance and Sexual Activity    Alcohol use: Yes     Comment: socailly/rarely    Drug use: Never    Sexual activity: Yes     Partners: Male     Birth control/protection: Male Sterilization       Objective   /76   Temp 98.4 °F (36.9 °C) (Temporal)   LMP  (LMP Unknown)     Physical Exam  Vitals reviewed.   Constitutional:       Appearance: Normal appearance.   Eyes:      Extraocular Movements: Extraocular movements intact.      Conjunctiva/sclera: Conjunctivae normal.   Cardiovascular:      Rate and Rhythm: Normal rate.   Pulmonary:      Effort: Pulmonary effort is normal.   Abdominal:      General: Abdomen is flat.   Skin:     General: Skin is warm and dry.   Neurological:      Mental Status: She is alert.   Psychiatric:         Mood and Affect: Mood normal.         Behavior: Behavior normal.

## 2024-12-16 ENCOUNTER — PATIENT MESSAGE (OUTPATIENT)
Dept: FAMILY MEDICINE CLINIC | Facility: CLINIC | Age: 39
End: 2024-12-16

## 2025-01-06 ENCOUNTER — PATIENT MESSAGE (OUTPATIENT)
Dept: FAMILY MEDICINE CLINIC | Facility: CLINIC | Age: 40
End: 2025-01-06

## 2025-01-06 DIAGNOSIS — F90.9 ATTENTION DEFICIT HYPERACTIVITY DISORDER (ADHD), UNSPECIFIED ADHD TYPE: ICD-10-CM

## 2025-01-06 RX ORDER — DEXTROAMPHETAMINE SACCHARATE, AMPHETAMINE ASPARTATE MONOHYDRATE, DEXTROAMPHETAMINE SULFATE AND AMPHETAMINE SULFATE 3.75; 3.75; 3.75; 3.75 MG/1; MG/1; MG/1; MG/1
15 CAPSULE, EXTENDED RELEASE ORAL EVERY MORNING
Qty: 30 CAPSULE | Refills: 0 | Status: SHIPPED | OUTPATIENT
Start: 2025-01-06

## 2025-01-29 ENCOUNTER — OFFICE VISIT (OUTPATIENT)
Dept: DERMATOLOGY | Facility: CLINIC | Age: 40
End: 2025-01-29
Payer: COMMERCIAL

## 2025-01-29 VITALS — TEMPERATURE: 98.2 F | BODY MASS INDEX: 21.97 KG/M2 | WEIGHT: 124 LBS

## 2025-01-29 DIAGNOSIS — D22.61 MULTIPLE BENIGN MELANOCYTIC NEVI OF BOTH UPPER EXTREMITIES, BOTH LOWER EXTREMITIES, AND TRUNK: ICD-10-CM

## 2025-01-29 DIAGNOSIS — Z12.83 SKIN EXAM, SCREENING FOR CANCER: Primary | ICD-10-CM

## 2025-01-29 DIAGNOSIS — D22.5 MULTIPLE BENIGN MELANOCYTIC NEVI OF BOTH UPPER EXTREMITIES, BOTH LOWER EXTREMITIES, AND TRUNK: ICD-10-CM

## 2025-01-29 DIAGNOSIS — L81.4 SOLAR LENTIGO: ICD-10-CM

## 2025-01-29 DIAGNOSIS — D22.72 MULTIPLE BENIGN MELANOCYTIC NEVI OF BOTH UPPER EXTREMITIES, BOTH LOWER EXTREMITIES, AND TRUNK: ICD-10-CM

## 2025-01-29 DIAGNOSIS — D22.71 MULTIPLE BENIGN MELANOCYTIC NEVI OF BOTH UPPER EXTREMITIES, BOTH LOWER EXTREMITIES, AND TRUNK: ICD-10-CM

## 2025-01-29 DIAGNOSIS — D22.62 MULTIPLE BENIGN MELANOCYTIC NEVI OF BOTH UPPER EXTREMITIES, BOTH LOWER EXTREMITIES, AND TRUNK: ICD-10-CM

## 2025-01-29 PROCEDURE — 99203 OFFICE O/P NEW LOW 30 MIN: CPT | Performed by: DERMATOLOGY

## 2025-01-29 NOTE — PROGRESS NOTES
"[facility] Note     Patient Name: Yvrose Seaman  Encounter Date: 1/29/2025     Have you been cared for by a [facility] Dermatologist in the last 3 years and, if so, which description applies to you?    NO.   I am considered a \"new\" patient and must complete all patient intake questions. I am FEMALE/of child-bearing potential.    REVIEW OF SYSTEMS:  Have you recently had or currently have any of the following? Recent fever or chills? No  Any non-healing wound? No  Are you pregnant or planning to become pregnant? No  Are you currently or planning to be nursing or breast feeding? No   PAST MEDICAL HISTORY:  Have you personally ever had or currently have any of the following?  If \"YES,\" then please provide more detail. Skin cancer (such as Melanoma, Basal Cell Carcinoma, Squamous Cell Carcinoma?  No  Tuberculosis, HIV/AIDS, Hepatitis B or C: No  Radiation Treatment No   HISTORY OF IMMUNOSUPPRESSION:   Do you have a history of any of the following:  Systemic Immunosuppression such as Diabetes, Biologic or Immunotherapy, Chemotherapy, Organ Transplantation, Bone Marrow Transplantation or Prednsione?  No    Answering \"YES\" requires the addition of the dotphrase \"IMMUNOSUPPRESSED\" as the first diagnosis of the patient's visit.   FAMILY HISTORY:  Any \"first degree relatives\" (parent, brother, sister, or child) with the following?    Skin Cancer, Pancreatic or Other Cancer? No   PATIENT EXPERIENCE:    Do you want the Dermatologist to perform a COMPLETE skin exam today including a clinical examination under the \"bra and underwear\" areas?  Yes  If necessary, do we have your permission to call and leave a detailed message on your Preferred Phone number that includes your specific medical information?  Yes      Allergies   Allergen Reactions    Oxycodone Vomiting      Current Outpatient Medications:     albuterol (PROVENTIL HFA,VENTOLIN HFA) 90 mcg/act inhaler, Inhale 2 puffs every 6 (six) hours as needed for " "wheezing, Disp: , Rfl:     amitriptyline (ELAVIL) 10 mg tablet, Take 1 tablet (10 mg total) by mouth daily at bedtime, Disp: 90 tablet, Rfl: 1    amphetamine-dextroamphetamine (ADDERALL XR, 15MG,) 15 MG 24 hr capsule, Take 1 capsule (15 mg total) by mouth every morning Max Daily Amount: 15 mg, Disp: 30 capsule, Rfl: 0    cetirizine (ZyrTEC) 10 mg tablet, Take 10 mg by mouth daily, Disp: , Rfl:         Whom besides the patient is providing clinical information about today's encounter?   NO ADDITIONAL HISTORIAN (patient alone provided history)    Physical Exam and Assessment/Plan by Diagnosis:      SOLAR LENTIGINES      Physical Exam:  Anatomic Location Affected:  Sun exposed areas of back, chest, arms, legs  Morphological Description:  Multiple scattered brown to tan evenly pigmented macules   Denies pain, itch, bleeding. No treatments tried. Present for months - years. Reports getting newer lesions with sun exposure.       Assessment and Plan:  Based on a thorough discussion of this condition and the management approach to it (including a comprehensive discussion of the known risks, side effects and potential benefits of treatment), the patient (family) agrees to implement the following specific plan:  Reassure benign  Use sun protection.  Apply SPF 30 or higher at least three times a day.  Wear sun protecting clothing and hats.  Discussed cosmetic laser treatment for removal. Patient will contact the [facility] for scheduling.       MULTIPLE MELANOCYTIC NEVI (\"Moles\")     Physical Exam:  Anatomic Location Affected: Trunk and extremities  Morphological Description:  Scattered, round to ovoid, symmetrical-appearing, even bordered, skin colored to dark brown macules/papules  Denies pain, itch, bleeding. No treatments tried. Present for years. Present constantly; no modifying factors which make it worse or better. Denies actively changing or growing moles.      Assessment and Plan:  Based on a thorough discussion of this " condition and the management approach to it (including a comprehensive discussion of the known risks, side effects and potential benefits of treatment), the patient (family) agrees to implement the following specific plan:  Reassure benign  Monitor for changes  Use sun protection.  Apply SPF 30 or higher at least three times a day.  Wear sun protecting clothing and hats.     Worrisome signs of skin malignancy discussed, questions answered. Regular self-skin check discussed. Advised to call or return to office if patient notices any spots of concern, rapidly growing/changing lesions, bleeding lesions, non-healing lesions. Advised regular SPF use.     Scribe Attestation      I,:  Christina Olmos MA am acting as a scribe while in the presence of the attending physician.:       I,:  Gianna Daly MD personally performed the services described in this documentation    as scribed in my presence.:

## 2025-01-29 NOTE — PATIENT INSTRUCTIONS
"SOLAR LENTIGINES   OTHER SKIN CHANGES DUE TO CHRONIC EXPOSURE TO NONIONIZING RADIATION     Physical Exam:  Anatomic Location Affected:  Sun exposed areas of back, chest, arms, legs  Morphological Description:  Multiple scattered brown to tan evenly pigmented macules   Denies pain, itch, bleeding. No treatments tried. Present for months - years. Reports getting newer lesions with sun exposure.       Assessment and Plan:  Based on a thorough discussion of this condition and the management approach to it (including a comprehensive discussion of the known risks, side effects and potential benefits of treatment), the patient (family) agrees to implement the following specific plan:  Reassure benign  Use sun protection.  Apply SPF 30 or higher at least three times a day.  Wear sun protecting clothing and hats.  Discussed cosmetic laser treatment for removal. Patient will contact the [facility] for scheduling.       MULTIPLE MELANOCYTIC NEVI (\"Moles\")     Physical Exam:  Anatomic Location Affected: Trunk and extremities  Morphological Description:  Scattered, round to ovoid, symmetrical-appearing, even bordered, skin colored to dark brown macules/papules  Denies pain, itch, bleeding. No treatments tried. Present for years. Present constantly; no modifying factors which make it worse or better. Denies actively changing or growing moles.      Assessment and Plan:  Based on a thorough discussion of this condition and the management approach to it (including a comprehensive discussion of the known risks, side effects and potential benefits of treatment), the patient (family) agrees to implement the following specific plan:  Reassure benign  Monitor for changes  Use sun protection.  Apply SPF 30 or higher at least three times a day.  Wear sun protecting clothing and hats.     Worrisome signs of skin malignancy discussed, questions answered. Regular self-skin check discussed. Advised to call or return to office if patient notices " any spots of concern, rapidly growing/changing lesions, bleeding lesions, non-healing lesions. Advised regular SPF use.

## 2025-02-11 DIAGNOSIS — F43.21 SITUATIONAL DEPRESSION: ICD-10-CM

## 2025-02-11 DIAGNOSIS — F90.9 ATTENTION DEFICIT HYPERACTIVITY DISORDER (ADHD), UNSPECIFIED ADHD TYPE: ICD-10-CM

## 2025-02-11 RX ORDER — AMITRIPTYLINE HYDROCHLORIDE 10 MG/1
10 TABLET ORAL
Qty: 90 TABLET | Refills: 1 | Status: SHIPPED | OUTPATIENT
Start: 2025-02-11

## 2025-02-11 RX ORDER — DEXTROAMPHETAMINE SACCHARATE, AMPHETAMINE ASPARTATE MONOHYDRATE, DEXTROAMPHETAMINE SULFATE AND AMPHETAMINE SULFATE 3.75; 3.75; 3.75; 3.75 MG/1; MG/1; MG/1; MG/1
15 CAPSULE, EXTENDED RELEASE ORAL EVERY MORNING
Qty: 30 CAPSULE | Refills: 0 | Status: SHIPPED | OUTPATIENT
Start: 2025-02-11

## 2025-04-30 ENCOUNTER — OFFICE VISIT (OUTPATIENT)
Dept: SLEEP CENTER | Facility: CLINIC | Age: 40
End: 2025-04-30
Payer: COMMERCIAL

## 2025-04-30 VITALS
BODY MASS INDEX: 21.97 KG/M2 | WEIGHT: 124 LBS | HEIGHT: 63 IN | DIASTOLIC BLOOD PRESSURE: 70 MMHG | SYSTOLIC BLOOD PRESSURE: 100 MMHG

## 2025-04-30 DIAGNOSIS — F41.9 ANXIETY: ICD-10-CM

## 2025-04-30 DIAGNOSIS — G47.8 NON-RESTORATIVE SLEEP: ICD-10-CM

## 2025-04-30 DIAGNOSIS — F90.9 ATTENTION DEFICIT HYPERACTIVITY DISORDER (ADHD), UNSPECIFIED ADHD TYPE: ICD-10-CM

## 2025-04-30 DIAGNOSIS — G47.30 SLEEP APNEA, UNSPECIFIED TYPE: Primary | ICD-10-CM

## 2025-04-30 DIAGNOSIS — F43.9 STRESS AT HOME: ICD-10-CM

## 2025-04-30 DIAGNOSIS — G47.09 OTHER INSOMNIA: ICD-10-CM

## 2025-04-30 DIAGNOSIS — Z91.09 ENVIRONMENTAL ALLERGIES: ICD-10-CM

## 2025-04-30 DIAGNOSIS — G47.19 EXCESSIVE DAYTIME SLEEPINESS: ICD-10-CM

## 2025-04-30 PROCEDURE — 99214 OFFICE O/P EST MOD 30 MIN: CPT | Performed by: INTERNAL MEDICINE

## 2025-04-30 NOTE — PROGRESS NOTES
"Name: Yvrose Seaman      : 1985      MRN: [id number]  Encounter Provider: Ashwin Lal MD  Encounter Date: 2025   Encounter department: [facility] [place]  :  Assessment & Plan  Sleep apnea, unspecified type    Orders:    Diagnostic Polysomnogram; Future    Other insomnia    Orders:    Ambulatory Referral to Psych Services; Future    Non-restorative sleep         Excessive daytime sleepiness         Stress at home         Anxiety         Attention deficit hyperactivity disorder (ADHD), unspecified ADHD type         Environmental allergies             History of Present Illness   HPI              Follow-Up Note - Sleep Center   Yvrose Seaman  39 y.o. female  :1985  MRN:[id number]  DOS:2025    CC: I saw this patient for follow-up in clinic today for sleep disordered breathing, Coexisting Sleep and Medical Problems. Interval changes: Home sleep testing was undertaken to evaluate for sleep disordered breathing and patient is here to review results and further options.     The study demonstrated a respiratory event index (LALY) of 3.3.  The lowest SpO2.  recorded is 89%.  The snore index was 0.1%.  Additional comments by patient: Her sleep was not as good as her usual sleep because of disturbance from children and stress at home.    PFSH, Problem List, Medications & Allergies were reviewed in EMR.   She  has a past medical history of Migraines.    She has a current medication list which includes the following prescription(s): albuterol, amitriptyline, cetirizine, amphetamine-dextroamphetamine, and amphetamine-dextroamphetamine.    HPI: She continues to report difficulty initiating and maintaining sleep.  She has ongoing snoring and at times awakens herself snoring or snorting  Sleep Routine: Yvrose reports getting 6 hrs sleep out of around 8-1/2 hours in bed and in spite of amitriptyline, otherwise \"will not get any sleep \".; she has difficulty initiating and maintaining " "sleep . She arises before alarm most days and never feels rested.Yvrose reports daytime sleepiness, feels like napping but avoids and is usually able to.  She rated herself at Total score: 10 /24 on the [place]er Sleepiness Scale.   Habits: Reports that she has never smoked. She has never been exposed to tobacco smoke. She has never used smokeless tobacco.,  Reports current alcohol use.,  Reports no history of drug use., Caffeine use: none, Exercise routine: sometimes.     ROS: reviewed significant for weight has been stable.  She feels allergies are controlled.  She has stress at home and ongoing feelings of anxiety.  She discontinued Adderall because felt it made no difference.        EXAM: /70   Ht 5' 3\" (1.6 m)   Wt 56.2 kg (124 lb)   BMI 21.97 kg/m²     Wt Readings from Last 3 Encounters:   04/30/25 56.2 kg (124 lb)   01/29/25 56.2 kg (124 lb)   04/24/24 56.2 kg (124 lb)     Patient is well groomed; well appearing.  CNS: Alert, orientated, clear & coherent speech.  Psych: cooperative and in no distress. Mental state: Appears anxious.  H&N: EOMI; NC/AT: No facial pressure marks, no rashes.    Skin/Extrem: col & hydration normal; no edema  Resp: Respiratory effort is normal  Physical findings otherwise essentially unchanged from previous.      PLAN  -  Problem List Items & Comorbidities Addressed this Visit :  Problems & Comorbidities Addressed this Visit as listed.  Stable/controlled conditions reviewed in notes.  With respect to above conditions, comprehensive counseling provided on pathophysiology; effects on symptoms and comorbidities, diagnostic strategies & limitations; treatment options; risks or no treament; risks & benefits of the various therapeutic options; costs and insurance aspects.     I advised weight reduction, avoiding sleeping supine, using alcohol or sedating medications close to bed time and on safe driving practices.    Since there were ongoing symptoms, even though home sleep " "testing was negative, further evaluation is indicated and a lab based sleep study will be scheduled.   She is willing to try CPAP if necessary.  Multi component Cognitive behavioral therapy for Insomnia undertaken - Sleep Restriction, Stimulus control, Relaxation techniques and Sleep hygiene were discussed.  With her consent, I provided a referral for CBT-I.  Follow-up to be scheduled after testing/initiation of therapy to review results, further details of treatment options and to adjust therapy.      Sincerely,     Authenticated electronically on 04/30/25   Board Certified Specialist     Portions of the record may have been created with voice recognition software. Occasional wrong word or \"sound a like\" substitutions may have occurred due to the inherent limitations of voice recognition software. There may also be notations and random deletions of words or characters from malfunctioning software. Read the chart carefully and recognize, using context, where substitutions/deletions have occurred.    Sitting and reading: High chance of dozing  Watching TV: Slight chance of dozing  Sitting, inactive in a public place (e.g. a theatre or a meeting): Would never doze  As a passenger in a car for an hour without a break: Slight chance of dozing  Lying down to rest in the afternoon when circumstances permit: Moderate chance of dozing  Sitting and talking to someone: Slight chance of dozing  Sitting quietly after a lunch without alcohol: Moderate chance of dozing  In a car, while stopped for a few minutes in traffic: Would never doze  Total score: 10     Review of Systems  Pertinent positives/negatives included in HPI and also as noted:       Objective   /70   Ht 5' 3\" (1.6 m)   Wt 56.2 kg (124 lb)   BMI 21.97 kg/m²        Physical Exam    Data  Lab Results   Component Value Date    HGB 13.7 09/09/2024    HCT 41.8 09/09/2024    MCV 96 09/09/2024      Lab Results   Component Value Date    CALCIUM 8.7 09/09/2024    K " "4.6 09/09/2024    CO2 27 09/09/2024     09/09/2024    BUN 14 09/09/2024    CREATININE 0.76 09/09/2024     No results found for: \"IRON\", \"TIBC\", \"FERRITIN\"  No results found for: \"AST\", \"ALT\"      "

## 2025-04-30 NOTE — PATIENT INSTRUCTIONS
What you can do to improve your sleep: (Sleep Hygiene) Basic rules for a good night's sleep  Create a regular sleep schedule. This will help you form a sleep routine. Keep a record of your sleep patterns, and any sleeping problems you have. Bring the record to follow-up visits with healthcare providers.  Avoid prolonged use of light-emitting screens before bedtime or watching TV in bed.  Avoid forcing sleep.  Do not take naps. Naps could make it hard for you to fall asleep at bedtime.  Deal with your worries before bedtime.  Keep your bedroom cool, quiet, and dark. Turn on white noise, such as a fan, to help you relax. Do not use your bed for any activity that will keep you awake. Do not read, exercise, eat, or watch TV in your bedroom.  Get up if you do not fall asleep within 20 minutes. Move to another room and do something relaxing until you become sleepy.  Limit caffeine, alcohol, nicotine and food to earlier in the day. Only drink caffeine in the morning. Do not drink alcohol within 6 hours of bedtime. Do not eat a heavy meal right before you go to bed. Avoid smoking, especially in the evening.  Exercise regularly. Daily exercise will help you sleep better. Do not exercise within 4 hours of bedtime.  Stimulus control therapy rules  1. Go to bed only when sleepy.  2. Do not watch television, read, eat, or worry while in bed. Use bed only for sleep and sex.  3. Get out of bed if unable to fall asleep within 20 minutes and go to another room. Return to bed only when sleepy. Repeat this step as many times as necessary throughout the night.  4. Set an alarm clock to wake up at a fixed time each morning, including weekends.  5. Do not take a nap during the day.  Data from: Tomasa RR, Tea ML. Nonpharmacologic treatments of insomnia. J Clin Psychiatry 1992; 53:37.  Go to [employer]ASM website for more information: Sleepeducation.org  Recommended Reading: Book by rhiannon Eastman   No More sleepless nights    Nursing  Support:  When: Monday through Friday 7:30A-4:30PM except holidays  Where: Our direct line is [phone]  *3  *1.      If you are having a true emergency please call 911.  In the event that the line is busy or it is after hours please leave a voice message and we will return your call.  Please speak clearly, leaving your full name, birth date, best number to reach you and the reason for your call.   Medication refills: We will need the name of the medication, the dosage, the ordering provider, whether you get a 30 or 90 day refill, and the pharmacy name and address.  Medications will be ordered by the provider only.  Nurses cannot call in prescriptions.  Please allow 7 days for medication refills.  Physician requested updates: If your provider requested that you call with an update after starting medication, please be ready to provide us the medication and dosage, what time you take your medication, the time you attempt to fall asleep, time you fall asleep, when you wake up, and what time you get out of bed.  Sleep Study Results: We will contact you with sleep study results and/or next steps after the physician has reviewed your testing.

## 2025-05-01 ENCOUNTER — TELEPHONE (OUTPATIENT)
Age: 40
End: 2025-05-01

## 2025-05-06 ENCOUNTER — HOSPITAL ENCOUNTER (OUTPATIENT)
Facility: HOSPITAL | Age: 40
Discharge: HOME | End: 2025-05-06
Payer: COMMERCIAL

## 2025-05-06 VITALS — WEIGHT: 124 LBS | BODY MASS INDEX: 21.97 KG/M2 | HEIGHT: 63 IN

## 2025-05-06 DIAGNOSIS — Z12.31 ENCOUNTER FOR SCREENING MAMMOGRAM FOR BREAST CANCER: ICD-10-CM

## 2025-05-06 PROCEDURE — 77067 SCR MAMMO BI INCL CAD: CPT

## 2025-05-06 PROCEDURE — 77063 BREAST TOMOSYNTHESIS BI: CPT

## 2025-05-09 ENCOUNTER — RESULTS FOLLOW-UP (OUTPATIENT)
Dept: FAMILY MEDICINE CLINIC | Facility: CLINIC | Age: 40
End: 2025-05-09

## 2025-05-09 DIAGNOSIS — Z12.31 ENCOUNTER FOR SCREENING MAMMOGRAM FOR BREAST CANCER: Primary | ICD-10-CM

## 2025-08-13 ENCOUNTER — ANNUAL EXAM (OUTPATIENT)
Dept: OBGYN CLINIC | Facility: CLINIC | Age: 40
End: 2025-08-13
Payer: COMMERCIAL

## 2025-08-22 ENCOUNTER — TRANSCRIBE ORDERS (OUTPATIENT)
Dept: LAB | Facility: CLINIC | Age: 40
End: 2025-08-22

## 2025-08-22 DIAGNOSIS — Z00.8 ENCOUNTER FOR OTHER GENERAL EXAMINATION: Primary | ICD-10-CM
